# Patient Record
Sex: FEMALE | Race: WHITE | NOT HISPANIC OR LATINO | URBAN - METROPOLITAN AREA
[De-identification: names, ages, dates, MRNs, and addresses within clinical notes are randomized per-mention and may not be internally consistent; named-entity substitution may affect disease eponyms.]

---

## 2021-10-21 ENCOUNTER — LAB OUTSIDE AN ENCOUNTER (OUTPATIENT)
Dept: URBAN - METROPOLITAN AREA CLINIC 74 | Facility: CLINIC | Age: 66
End: 2021-10-21

## 2021-10-21 ENCOUNTER — OFFICE VISIT (OUTPATIENT)
Dept: URBAN - METROPOLITAN AREA CLINIC 74 | Facility: CLINIC | Age: 66
End: 2021-10-21
Payer: MEDICARE

## 2021-10-21 VITALS
DIASTOLIC BLOOD PRESSURE: 64 MMHG | SYSTOLIC BLOOD PRESSURE: 100 MMHG | HEART RATE: 49 BPM | WEIGHT: 178 LBS | HEIGHT: 64 IN | OXYGEN SATURATION: 97 % | BODY MASS INDEX: 30.39 KG/M2 | TEMPERATURE: 97.5 F

## 2021-10-21 DIAGNOSIS — Z87.11 PERSONAL HISTORY OF PEPTIC ULCER DISEASE: ICD-10-CM

## 2021-10-21 DIAGNOSIS — I48.0 PAROXYSMAL ATRIAL FIBRILLATION: ICD-10-CM

## 2021-10-21 DIAGNOSIS — N28.9 RENAL DYSFUNCTION: ICD-10-CM

## 2021-10-21 DIAGNOSIS — Z90.49 HISTORY OF CHOLECYSTECTOMY: ICD-10-CM

## 2021-10-21 DIAGNOSIS — K21.9 GASTROESOPHAGEAL REFLUX DISEASE, UNSPECIFIED WHETHER ESOPHAGITIS PRESENT: ICD-10-CM

## 2021-10-21 DIAGNOSIS — K27.1: ICD-10-CM

## 2021-10-21 DIAGNOSIS — Z86.010 HISTORY OF COLON POLYPS: ICD-10-CM

## 2021-10-21 DIAGNOSIS — R15.9 FULL INCONTINENCE OF FECES: ICD-10-CM

## 2021-10-21 DIAGNOSIS — R13.19 ESOPHAGEAL DYSPHAGIA: ICD-10-CM

## 2021-10-21 DIAGNOSIS — R15.2 FECAL URGENCY: ICD-10-CM

## 2021-10-21 DIAGNOSIS — Z79.01 CHRONIC ANTICOAGULATION: ICD-10-CM

## 2021-10-21 DIAGNOSIS — R12 HEARTBURN: ICD-10-CM

## 2021-10-21 PROBLEM — 428283002: Status: ACTIVE | Noted: 2021-10-21

## 2021-10-21 PROBLEM — 71820002: Status: ACTIVE | Noted: 2021-10-21

## 2021-10-21 PROBLEM — 711150003: Status: ACTIVE | Noted: 2021-10-21

## 2021-10-21 PROBLEM — 16331000: Status: ACTIVE | Noted: 2021-10-21

## 2021-10-21 PROBLEM — 142251000119102: Status: ACTIVE | Noted: 2021-10-21

## 2021-10-21 PROBLEM — 40890009: Status: ACTIVE | Noted: 2021-10-21

## 2021-10-21 PROBLEM — 266998003: Status: ACTIVE | Noted: 2021-10-21

## 2021-10-21 PROBLEM — 428882003: Status: ACTIVE | Noted: 2021-10-21

## 2021-10-21 PROBLEM — 235595009: Status: ACTIVE | Noted: 2021-10-21

## 2021-10-21 PROBLEM — 34921009: Status: ACTIVE | Noted: 2021-10-21

## 2021-10-21 PROBLEM — 282825002: Status: ACTIVE | Noted: 2021-10-21

## 2021-10-21 PROCEDURE — 99244 OFF/OP CNSLTJ NEW/EST MOD 40: CPT | Performed by: INTERNAL MEDICINE

## 2021-10-21 RX ORDER — ESCITALOPRAM 10 MG/1
1 TABLET TABLET, FILM COATED ORAL ONCE A DAY
Status: ACTIVE | COMMUNITY

## 2021-10-21 RX ORDER — FENOFIBRATE 145 MG/1
1 TABLET TABLET, FILM COATED ORAL ONCE A DAY
Status: DISCONTINUED | COMMUNITY

## 2021-10-21 RX ORDER — MINERAL OIL 100 ML/100ML
2 TABLETS AS NEEDED LIQUID RECTAL
Status: ACTIVE | COMMUNITY

## 2021-10-21 RX ORDER — PANTOPRAZOLE SODIUM 40 MG/1
1 TABLET TABLET, DELAYED RELEASE ORAL ONCE A DAY
Status: DISCONTINUED | COMMUNITY

## 2021-10-21 RX ORDER — ESCITALOPRAM OXALATE 10 MG/1
1 TABLET TABLET, FILM COATED ORAL ONCE A DAY
Status: DISCONTINUED | COMMUNITY

## 2021-10-21 RX ORDER — APIXABAN 5 MG/1
AS DIRECTED TABLET, FILM COATED ORAL
Status: ACTIVE | COMMUNITY

## 2021-10-21 RX ORDER — TOPIRAMATE 25 MG/1
1 TABLET TABLET, COATED ORAL ONCE A DAY
Status: DISCONTINUED | COMMUNITY

## 2021-10-21 RX ORDER — COLESEVELAM HYDROCHLORIDE 625 MG/1
I TABLET ONE HOUR BEFORE MEALS TABLET, FILM COATED ORAL
Qty: 270 | Refills: 1 | OUTPATIENT

## 2021-10-21 RX ORDER — POLYETHYLENE GLYOCOL 3350, SODIUM CHLORIDE, SODIUM BICARBONATE AND POTASSIUM CHLORIDE 420; 11.2; 5.72; 1.48 G/4L; G/4L; G/4L; G/4L
AS DIRECTED POWDER, FOR SOLUTION NASOGASTRIC; ORAL
Qty: 1 | Refills: 0 | OUTPATIENT
Start: 2021-10-21 | End: 2021-10-22

## 2021-10-21 RX ORDER — AMLODIPINE BESYLATE 10 MG/1
1 TABLET TABLET ORAL ONCE A DAY
Status: ACTIVE | COMMUNITY

## 2021-10-21 NOTE — HPI-TODAY'S VISIT:
The patient is a 66-year-old white female.  The patient was referred by Dr. Julita Benites for consultation.  A copy of this document is being forwarded to the referring provider.  The patient presents to the office with a 3-year history of fecal urgency and fecal incontinence, it usually postprandial, the patient denies having any abdominal pain, states that she had a cholecystectomy over 10 years ago, the patient denied having any rectal bleeding or hematochezia.  The patient has been unable to eat identify any particular food that triggers these events.  The patient claims that she had a colonoscopy over 15 years ago and polyps were removed at the time.  There is no family history of colon cancer or colon polyps.  The patient denied any unexplained weight loss.  The patient also complaint of gastroesophageal reflux and heartburn, denied having any upper abdominal pain, claims having intermittent dysphagia to solids but not to liquids.  In October 2020 the patient was seen in the emergency room with a perforated ulcer which was repaired by Dr. Santos.  At the time she took PPIs, currently she does not take H2 blockers or PPIs.  The patient has a history of atrial fibrillation and is anticoagulated on Eliquis, the patient denied any history of diabetes, thyroid disease.  The patient has been recommended to follow antireflux measures and diet, the patient will be scheduled to have an EGD and a colonoscopy.  Benefits potential complications and alternatives to both procedures were disclosed.  The patient will be treated with WelChol 1 tablet 1 hour before meals.  We will obtain recent laboratory obtained by primary care for review.  The patient is currently being seen by nephrology for renal dysfunction and is scheduled to have an abdominal ultrasound.  The patient will return for a follow-up visit after completion of tests.

## 2021-10-22 ENCOUNTER — TELEPHONE ENCOUNTER (OUTPATIENT)
Dept: URBAN - METROPOLITAN AREA CLINIC 74 | Facility: CLINIC | Age: 66
End: 2021-10-22

## 2021-10-28 ENCOUNTER — TELEPHONE ENCOUNTER (OUTPATIENT)
Dept: URBAN - METROPOLITAN AREA CLINIC 74 | Facility: CLINIC | Age: 66
End: 2021-10-28

## 2021-12-07 ENCOUNTER — CLAIMS CREATED FROM THE CLAIM WINDOW (OUTPATIENT)
Dept: URBAN - METROPOLITAN AREA CLINIC 4 | Facility: CLINIC | Age: 66
End: 2021-12-07
Payer: MEDICARE

## 2021-12-07 ENCOUNTER — OFFICE VISIT (OUTPATIENT)
Dept: URBAN - METROPOLITAN AREA SURGERY CENTER 30 | Facility: SURGERY CENTER | Age: 66
End: 2021-12-07
Payer: MEDICARE

## 2021-12-07 DIAGNOSIS — K29.81 DUODENITIS WITH BLEEDING: ICD-10-CM

## 2021-12-07 DIAGNOSIS — D17.5 LIPOMA OF COLON: ICD-10-CM

## 2021-12-07 DIAGNOSIS — D17.9 BENIGN LIPOMATOUS NEOPLASM, UNSPECIFIED: ICD-10-CM

## 2021-12-07 DIAGNOSIS — K21.9 GASTRO-ESOPHAGEAL REFLUX DISEASE WITHOUT ESOPHAGITIS: ICD-10-CM

## 2021-12-07 DIAGNOSIS — K62.1 ANAL AND RECTAL POLYP: ICD-10-CM

## 2021-12-07 DIAGNOSIS — K21.9 ACID REFLUX: ICD-10-CM

## 2021-12-07 DIAGNOSIS — K63.89 POLYP, HYPERPLASTIC: ICD-10-CM

## 2021-12-07 DIAGNOSIS — R15.9 FECAL INCONTINENCE: ICD-10-CM

## 2021-12-07 DIAGNOSIS — K29.70 GASTRITIS, UNSPECIFIED, WITHOUT BLEEDING: ICD-10-CM

## 2021-12-07 DIAGNOSIS — K29.80 ACUTE DUODENITIS: ICD-10-CM

## 2021-12-07 DIAGNOSIS — K31.89 ACQUIRED DEFORMITY OF DUODENUM: ICD-10-CM

## 2021-12-07 PROCEDURE — G8907 PT DOC NO EVENTS ON DISCHARG: HCPCS | Performed by: INTERNAL MEDICINE

## 2021-12-07 PROCEDURE — 88305 TISSUE EXAM BY PATHOLOGIST: CPT | Performed by: PATHOLOGY

## 2021-12-07 PROCEDURE — 88312 SPECIAL STAINS GROUP 1: CPT | Performed by: PATHOLOGY

## 2021-12-07 PROCEDURE — 43239 EGD BIOPSY SINGLE/MULTIPLE: CPT | Performed by: INTERNAL MEDICINE

## 2021-12-07 PROCEDURE — 45380 COLONOSCOPY AND BIOPSY: CPT | Performed by: INTERNAL MEDICINE

## 2021-12-16 ENCOUNTER — DASHBOARD ENCOUNTERS (OUTPATIENT)
Age: 66
End: 2021-12-16

## 2021-12-17 ENCOUNTER — TELEPHONE ENCOUNTER (OUTPATIENT)
Dept: URBAN - METROPOLITAN AREA CLINIC 74 | Facility: CLINIC | Age: 66
End: 2021-12-17

## 2021-12-22 ENCOUNTER — OFFICE VISIT (OUTPATIENT)
Dept: URBAN - METROPOLITAN AREA CLINIC 74 | Facility: CLINIC | Age: 66
End: 2021-12-22

## 2021-12-22 RX ORDER — ESCITALOPRAM 10 MG/1
1 TABLET TABLET, FILM COATED ORAL ONCE A DAY
Status: ACTIVE | COMMUNITY

## 2021-12-22 RX ORDER — AMLODIPINE BESYLATE 10 MG/1
1 TABLET TABLET ORAL ONCE A DAY
Status: ACTIVE | COMMUNITY

## 2021-12-22 RX ORDER — COLESEVELAM HYDROCHLORIDE 625 MG/1
I TABLET ONE HOUR BEFORE MEALS TABLET, FILM COATED ORAL
Qty: 270 | Refills: 1 | Status: ACTIVE | COMMUNITY

## 2021-12-22 RX ORDER — APIXABAN 5 MG/1
AS DIRECTED TABLET, FILM COATED ORAL
Status: ACTIVE | COMMUNITY

## 2021-12-22 RX ORDER — MINERAL OIL 100 ML/100ML
2 TABLETS AS NEEDED LIQUID RECTAL
Status: ACTIVE | COMMUNITY